# Patient Record
Sex: MALE | Race: BLACK OR AFRICAN AMERICAN | NOT HISPANIC OR LATINO | Employment: OTHER | URBAN - METROPOLITAN AREA
[De-identification: names, ages, dates, MRNs, and addresses within clinical notes are randomized per-mention and may not be internally consistent; named-entity substitution may affect disease eponyms.]

---

## 2023-04-06 ENCOUNTER — APPOINTMENT (OUTPATIENT)
Dept: PRIMARY CARE | Facility: CLINIC | Age: 28
End: 2023-04-06
Payer: COMMERCIAL

## 2023-06-27 ENCOUNTER — OFFICE VISIT (OUTPATIENT)
Dept: PRIMARY CARE | Facility: CLINIC | Age: 28
End: 2023-06-27
Payer: COMMERCIAL

## 2023-06-27 VITALS
HEART RATE: 69 BPM | OXYGEN SATURATION: 98 % | HEIGHT: 70 IN | RESPIRATION RATE: 17 BRPM | DIASTOLIC BLOOD PRESSURE: 77 MMHG | BODY MASS INDEX: 21.9 KG/M2 | SYSTOLIC BLOOD PRESSURE: 136 MMHG | WEIGHT: 153 LBS

## 2023-06-27 DIAGNOSIS — E55.9 VITAMIN D DEFICIENCY: ICD-10-CM

## 2023-06-27 DIAGNOSIS — Z00.00 HEALTH MAINTENANCE EXAMINATION: Primary | ICD-10-CM

## 2023-06-27 DIAGNOSIS — R10.84 GENERALIZED ABDOMINAL PAIN: ICD-10-CM

## 2023-06-27 DIAGNOSIS — K59.09 OTHER CONSTIPATION: ICD-10-CM

## 2023-06-27 DIAGNOSIS — Z11.3 ROUTINE SCREENING FOR STI (SEXUALLY TRANSMITTED INFECTION): ICD-10-CM

## 2023-06-27 DIAGNOSIS — K92.1 BLOODY STOOL: ICD-10-CM

## 2023-06-27 DIAGNOSIS — S89.91XA INJURY OF RIGHT KNEE, INITIAL ENCOUNTER: ICD-10-CM

## 2023-06-27 DIAGNOSIS — F10.10 ALCOHOL ABUSE: ICD-10-CM

## 2023-06-27 LAB
ALANINE AMINOTRANSFERASE (SGPT) (U/L) IN SER/PLAS: 20 U/L (ref 10–52)
ALBUMIN (G/DL) IN SER/PLAS: 5 G/DL (ref 3.4–5)
ALKALINE PHOSPHATASE (U/L) IN SER/PLAS: 55 U/L (ref 33–120)
ANION GAP IN SER/PLAS: 14 MMOL/L (ref 10–20)
APPEARANCE, URINE: CLEAR
ASPARTATE AMINOTRANSFERASE (SGOT) (U/L) IN SER/PLAS: 25 U/L (ref 9–39)
BASOPHILS (10*3/UL) IN BLOOD BY AUTOMATED COUNT: 0.05 X10E9/L (ref 0–0.1)
BASOPHILS/100 LEUKOCYTES IN BLOOD BY AUTOMATED COUNT: 1.1 % (ref 0–2)
BILIRUBIN DIRECT (MG/DL) IN SER/PLAS: 0.3 MG/DL (ref 0–0.3)
BILIRUBIN TOTAL (MG/DL) IN SER/PLAS: 1.2 MG/DL (ref 0–1.2)
BILIRUBIN, URINE: NEGATIVE
BLOOD, URINE: NEGATIVE
CALCIDIOL (25 OH VITAMIN D3) (NG/ML) IN SER/PLAS: 16 NG/ML
CALCIUM (MG/DL) IN SER/PLAS: 10.2 MG/DL (ref 8.6–10.6)
CARBON DIOXIDE, TOTAL (MMOL/L) IN SER/PLAS: 31 MMOL/L (ref 21–32)
CHLORIDE (MMOL/L) IN SER/PLAS: 100 MMOL/L (ref 98–107)
CHOLESTEROL (MG/DL) IN SER/PLAS: 129 MG/DL (ref 0–199)
CHOLESTEROL IN HDL (MG/DL) IN SER/PLAS: 49.8 MG/DL
CHOLESTEROL/HDL RATIO: 2.6
COBALAMIN (VITAMIN B12) (PG/ML) IN SER/PLAS: 582 PG/ML (ref 211–911)
COLOR, URINE: YELLOW
CREATININE (MG/DL) IN SER/PLAS: 0.93 MG/DL (ref 0.5–1.3)
EOSINOPHILS (10*3/UL) IN BLOOD BY AUTOMATED COUNT: 0.09 X10E9/L (ref 0–0.7)
EOSINOPHILS/100 LEUKOCYTES IN BLOOD BY AUTOMATED COUNT: 1.9 % (ref 0–6)
ERYTHROCYTE DISTRIBUTION WIDTH (RATIO) BY AUTOMATED COUNT: 12.7 % (ref 11.5–14.5)
ERYTHROCYTE MEAN CORPUSCULAR HEMOGLOBIN CONCENTRATION (G/DL) BY AUTOMATED: 32.6 G/DL (ref 32–36)
ERYTHROCYTE MEAN CORPUSCULAR VOLUME (FL) BY AUTOMATED COUNT: 98 FL (ref 80–100)
ERYTHROCYTES (10*6/UL) IN BLOOD BY AUTOMATED COUNT: 5.28 X10E12/L (ref 4.5–5.9)
ESTIMATED AVERAGE GLUCOSE FOR HBA1C: 82 MG/DL
FOLATE (NG/ML) IN SER/PLAS: 11 NG/ML
GFR MALE: >90 ML/MIN/1.73M2
GLUCOSE (MG/DL) IN SER/PLAS: 80 MG/DL (ref 74–99)
GLUCOSE, URINE: NEGATIVE MG/DL
HEMATOCRIT (%) IN BLOOD BY AUTOMATED COUNT: 51.5 % (ref 41–52)
HEMOGLOBIN (G/DL) IN BLOOD: 16.8 G/DL (ref 13.5–17.5)
HEMOGLOBIN A1C/HEMOGLOBIN TOTAL IN BLOOD: 4.5 %
HEPATITIS B VIRUS SURFACE AG PRESENCE IN SERUM: NONREACTIVE
HEPATITIS C VIRUS AB PRESENCE IN SERUM: NONREACTIVE
HIV 1/ 2 AG/AB SCREEN: NONREACTIVE
IMMATURE GRANULOCYTES/100 LEUKOCYTES IN BLOOD BY AUTOMATED COUNT: 0.2 % (ref 0–0.9)
KETONES, URINE: NEGATIVE MG/DL
LEUKOCYTE ESTERASE, URINE: NEGATIVE
LEUKOCYTES (10*3/UL) IN BLOOD BY AUTOMATED COUNT: 4.7 X10E9/L (ref 4.4–11.3)
LYMPHOCYTES (10*3/UL) IN BLOOD BY AUTOMATED COUNT: 1 X10E9/L (ref 1.2–4.8)
LYMPHOCYTES/100 LEUKOCYTES IN BLOOD BY AUTOMATED COUNT: 21.5 % (ref 13–44)
MONOCYTES (10*3/UL) IN BLOOD BY AUTOMATED COUNT: 0.4 X10E9/L (ref 0.1–1)
MONOCYTES/100 LEUKOCYTES IN BLOOD BY AUTOMATED COUNT: 8.6 % (ref 2–10)
NEUTROPHILS (10*3/UL) IN BLOOD BY AUTOMATED COUNT: 3.11 X10E9/L (ref 1.2–7.7)
NEUTROPHILS/100 LEUKOCYTES IN BLOOD BY AUTOMATED COUNT: 66.7 % (ref 40–80)
NITRITE, URINE: NEGATIVE
NON-HDL CHOLESTEROL: 79 MG/DL (ref 0–149)
NRBC (PER 100 WBCS) BY AUTOMATED COUNT: 0 /100 WBC (ref 0–0)
PH, URINE: 6 (ref 5–8)
PHOSPHATE (MG/DL) IN SER/PLAS: 3.7 MG/DL (ref 2.5–4.9)
PLATELETS (10*3/UL) IN BLOOD AUTOMATED COUNT: 381 X10E9/L (ref 150–450)
POTASSIUM (MMOL/L) IN SER/PLAS: 4.7 MMOL/L (ref 3.5–5.3)
PROTEIN TOTAL: 7.6 G/DL (ref 6.4–8.2)
PROTEIN, URINE: NEGATIVE MG/DL
SEDIMENTATION RATE, ERYTHROCYTE: <1 MM/H (ref 0–15)
SODIUM (MMOL/L) IN SER/PLAS: 140 MMOL/L (ref 136–145)
SPECIFIC GRAVITY, URINE: 1.02 (ref 1–1.03)
SYPHILIS TOTAL AB: NONREACTIVE
THYROTROPIN (MIU/L) IN SER/PLAS BY DETECTION LIMIT <= 0.05 MIU/L: 0.91 MIU/L (ref 0.44–3.98)
UREA NITROGEN (MG/DL) IN SER/PLAS: 10 MG/DL (ref 6–23)
UROBILINOGEN, URINE: <2 MG/DL (ref 0–1.9)

## 2023-06-27 PROCEDURE — 86780 TREPONEMA PALLIDUM: CPT

## 2023-06-27 PROCEDURE — 86803 HEPATITIS C AB TEST: CPT

## 2023-06-27 PROCEDURE — 85652 RBC SED RATE AUTOMATED: CPT

## 2023-06-27 PROCEDURE — 83036 HEMOGLOBIN GLYCOSYLATED A1C: CPT

## 2023-06-27 PROCEDURE — 1036F TOBACCO NON-USER: CPT | Performed by: STUDENT IN AN ORGANIZED HEALTH CARE EDUCATION/TRAINING PROGRAM

## 2023-06-27 PROCEDURE — 80048 BASIC METABOLIC PNL TOTAL CA: CPT

## 2023-06-27 PROCEDURE — 82465 ASSAY BLD/SERUM CHOLESTEROL: CPT

## 2023-06-27 PROCEDURE — 82746 ASSAY OF FOLIC ACID SERUM: CPT

## 2023-06-27 PROCEDURE — 87340 HEPATITIS B SURFACE AG IA: CPT

## 2023-06-27 PROCEDURE — 87491 CHLMYD TRACH DNA AMP PROBE: CPT

## 2023-06-27 PROCEDURE — 82306 VITAMIN D 25 HYDROXY: CPT

## 2023-06-27 PROCEDURE — 82607 VITAMIN B-12: CPT

## 2023-06-27 PROCEDURE — 83718 ASSAY OF LIPOPROTEIN: CPT

## 2023-06-27 PROCEDURE — 85025 COMPLETE CBC W/AUTO DIFF WBC: CPT

## 2023-06-27 PROCEDURE — 99204 OFFICE O/P NEW MOD 45 MIN: CPT | Performed by: STUDENT IN AN ORGANIZED HEALTH CARE EDUCATION/TRAINING PROGRAM

## 2023-06-27 PROCEDURE — 99385 PREV VISIT NEW AGE 18-39: CPT | Performed by: STUDENT IN AN ORGANIZED HEALTH CARE EDUCATION/TRAINING PROGRAM

## 2023-06-27 PROCEDURE — 84443 ASSAY THYROID STIM HORMONE: CPT

## 2023-06-27 PROCEDURE — 84100 ASSAY OF PHOSPHORUS: CPT

## 2023-06-27 PROCEDURE — 90715 TDAP VACCINE 7 YRS/> IM: CPT | Performed by: STUDENT IN AN ORGANIZED HEALTH CARE EDUCATION/TRAINING PROGRAM

## 2023-06-27 PROCEDURE — 80076 HEPATIC FUNCTION PANEL: CPT

## 2023-06-27 PROCEDURE — 87389 HIV-1 AG W/HIV-1&-2 AB AG IA: CPT

## 2023-06-27 PROCEDURE — 87591 N.GONORRHOEAE DNA AMP PROB: CPT

## 2023-06-27 PROCEDURE — 90471 IMMUNIZATION ADMIN: CPT | Performed by: STUDENT IN AN ORGANIZED HEALTH CARE EDUCATION/TRAINING PROGRAM

## 2023-06-27 PROCEDURE — 81003 URINALYSIS AUTO W/O SCOPE: CPT

## 2023-06-27 RX ORDER — ARM BRACE
1 EACH MISCELLANEOUS DAILY
Qty: 1 EACH | Refills: 0 | Status: SHIPPED | OUTPATIENT
Start: 2023-06-27

## 2023-06-27 RX ORDER — LANOLIN ALCOHOL/MO/W.PET/CERES
100 CREAM (GRAM) TOPICAL DAILY
Qty: 30 TABLET | Refills: 3 | Status: SHIPPED | OUTPATIENT
Start: 2023-06-27 | End: 2023-10-25

## 2023-06-27 RX ORDER — OMEPRAZOLE 20 MG/1
20 CAPSULE, DELAYED RELEASE ORAL DAILY
Qty: 90 CAPSULE | Refills: 0 | Status: SHIPPED | OUTPATIENT
Start: 2023-06-27 | End: 2023-09-25

## 2023-06-27 RX ORDER — MULTIVITAMIN
1 TABLET ORAL DAILY
Qty: 30 TABLET | Refills: 11 | Status: SHIPPED | OUTPATIENT
Start: 2023-06-27 | End: 2024-06-26

## 2023-06-27 RX ORDER — POLYETHYLENE GLYCOL 3350 17 G/17G
17 POWDER, FOR SOLUTION ORAL DAILY
Qty: 30 EACH | Refills: 3 | Status: SHIPPED | OUTPATIENT
Start: 2023-06-27 | End: 2023-06-29

## 2023-06-27 NOTE — PATIENT INSTRUCTIONS
Labs today in [address].     Tetanus shot today in clinic.     I will be in touch with lab results.     Anti-acid stomach medication and multivitamin sent to [facility].     Good job coming in today and cutting back on drinking--keep up the good work.    Follow up in 3 months!    Best,  Dr. SALAZAR

## 2023-06-27 NOTE — PROGRESS NOTES
Alpesh Spencer is a 27 y.o. male seen in Clinic at /[facility] by Dr. Gerald Aquino on 06/27/23 for routine care, as well as for management of the following chronic medical conditions: none. He presents today to establish care, for CPE, and with acute concern of ongoing alcohol abuse with abdominal pain and possible blood in stool, along with R knee pain after fall from ladder. Knee without any laxity on exam today, small effusion but improving per patient. Continue supportive care, ice/pain relief/compression. No indication for imaging at this time. Regarding abdominal pain, overall diffuse but somewhat lower/overlying bladder when asked. Has had significant alcohol abuse in last 5-6 months since death of father (up to pint per day of liquor) putting him at risk for gastritis/esophagitis. No prior history of prolonged GI symptoms per patient on initial questioning, however 2017 ED visit with note of bloody diarrhea. Also complains of some unintentional weight loss. Will perform labs, lytes, inflammatory markers, vitamin level to assess for deficiency given history. May need GI referral. Discussed alcohol cessation at length, defers IOP or psych referrals. Close follow up in 2-3 months for check in, sooner pending labs. Empiric PPI and MVI with folate, thiamine.     #Alcohol Use  - up to pint per day  - since father's death   - defers IOP or psych  - agreeable to MVI with folate, Thiamine   #Abdominal Pain  - has lost around 10 pounds, unintentional   - no food insecurity   - ?blood in stool   - empiric PPI  - lab screening   - may need GI referral pending persistence of symptoms, continued alcohol use, response to PPI, lab evaluation   #Knee Pain  - slight effusion on exam, improving, gait not impacted   - no indication for imaging   - supportive care      Past Medical History: as above   No past medical history on file.    Past Surgical History: none  No past surgical history on file.    Medications: none   No  "current outpatient medications on file.  Pharmacy: [facility]    Allergies: NKDA  No Known Allergies    Immunizations: Tdap today (last 2010)     Family History: father with death from MI around age 65   No family history on file.    Social History:   Home/Living Situation: lives at home with mother; feels safe at home   Education: [employer]   Employment/Work/Vocational: works as [employer]   Activities:   Drug Use: MJ use in the past but not currently; alcohol use up to 1 pint per day, has recently \"slowed down\"  Diet: no food insecurity   Sexuality/Contraception/Menstrual History: agreeable to STI screening today   Suicide/Depression/Anxiety: father's death in December has been difficult   Sleep: no concerns     Transition Processes:  Financial/Health Insurance: has insurance   Transportation: drives   Legal/Guardian: Claudette Richards, [phone]  Contact Information: [phone]    Visit Vitals  /77 (BP Location: Right arm, Patient Position: Sitting)   Pulse 69   Resp 17   Ht 1.778 m (5' 10\")   Wt 69.4 kg (153 lb)   SpO2 98%   BMI 21.95 kg/m²   Smoking Status Never   BSA 1.85 m²      PHYSICAL EXAM:   General: well appearing AA male, NAD, pleasant and engaged in encounter    HEENT: NCAT, MMM, no oral lesions noted   CV: RRR, no m/r/g  PULM: CTAB, non-labored respirations   ABD: soft, thin, mild TTP diffusely, ND, + bowel sounds; no rebound/guarding   : mild suprapubic tenderness; no CVA tenderness   EXT: WWP, no significant edema   SKIN: no rashes noted   NEURO: A&Ox4, symmetric facies, no gross motor or sensory deficits, normal gait  PSYCH: pleasant mood, appropriate affect     Assessment/Plan    Alpesh Spencer is a 27 y.o. male seen in Clinic at /[facility] by Dr. Gerald Aquino on 06/27/23 for routine care, as well as for management of the following chronic medical conditions: none. He presents today to establish care, for CPE, and with acute concern of ongoing alcohol " abuse with abdominal pain and possible blood in stool, along with R knee pain after fall from ladder. Knee without any laxity on exam today, small effusion but improving per patient. Continue supportive care, ice/pain relief/compression. No indication for imaging at this time. Regarding abdominal pain, overall diffuse but somewhat lower/overlying bladder when asked. Has had significant alcohol abuse in last 5-6 months since death of father (up to pint per day of liquor) putting him at risk for gastritis/esophagitis. No prior history of prolonged GI symptoms per patient on initial questioning, however 2017 ED visit with note of bloody diarrhea. Also complains of some unintentional weight loss. Will perform labs, lytes, inflammatory markers, vitamin level to assess for deficiency given history. May need GI referral. Discussed alcohol cessation at length, defers IOP or psych referrals. Close follow up in 2-3 months for check in, sooner pending labs. Empiric PPI and MVI with folate, thiamine.     #Alcohol Use  - up to pint per day  - since father's death   - defers IOP or psych  - agreeable to MVI with folate, Thiamine   #Abdominal Pain  - has lost around 10 pounds, unintentional   - no food insecurity   - ?blood in stool   - empiric PPI  - lab screening   - may need GI referral pending persistence of symptoms, continued alcohol use, response to PPI, lab evaluation   #Knee Pain  - slight effusion on exam, improving, gait not impacted   - no indication for imaging   - supportive care      #Health Maintenance   - Vision, Hearing screens: no concerns   - Counseling regarding alcohol/tobacco/drug use: as above   - Depression screen: recent death of father, defers counseling referral   - BMI, Lipid, A1C screening and nutritional/exercise counseling: labs today   - Blood Pressure: elevated (recent significant alcohol use)   - Safe Sexual Practices, STI, HIV screening: ordered today     #Transition of Care/Young Adult Care  -  Health Literacy Assessment: working to improve, engaged today   - Medications: Active medications reviewed and updated  - Allergies: Reviewed and updated   - Immunizations: Tdap today   - Identified Adult or Subspecialty Providers: Dr. Gerald Aquino  - Resources Provided: labs     Return to clinic in 2-3 months for follow-up, sooner if acute issues arise.     Gerald Aquino MD  Internal Medicine-Pediatrics   [facility] [address], [address]  P: [phone], F: [phone]

## 2023-06-28 LAB
CHLAMYDIA TRACH., AMPLIFIED: NEGATIVE
N. GONORRHEA, AMPLIFIED: NEGATIVE

## 2023-06-28 RX ORDER — CHOLECALCIFEROL (VITAMIN D3) 50 MCG
50 TABLET ORAL DAILY
Qty: 90 TABLET | Refills: 3 | Status: SHIPPED | OUTPATIENT
Start: 2023-06-28 | End: 2024-06-22

## 2023-06-29 DIAGNOSIS — K59.09 OTHER CONSTIPATION: ICD-10-CM

## 2023-06-29 RX ORDER — FLUTICASONE PROPIONATE 50 MCG
SPRAY, SUSPENSION (ML) NASAL
Qty: 28 PACKET | Refills: 3 | Status: SHIPPED | OUTPATIENT
Start: 2023-06-29

## 2024-04-19 ENCOUNTER — HOSPITAL ENCOUNTER (EMERGENCY)
Facility: HOSPITAL | Age: 29
Discharge: HOME | End: 2024-04-19
Attending: EMERGENCY MEDICINE

## 2024-04-19 VITALS
OXYGEN SATURATION: 98 % | RESPIRATION RATE: 16 BRPM | HEART RATE: 84 BPM | DIASTOLIC BLOOD PRESSURE: 76 MMHG | HEIGHT: 71 IN | TEMPERATURE: 97.5 F | BODY MASS INDEX: 21.7 KG/M2 | SYSTOLIC BLOOD PRESSURE: 150 MMHG | WEIGHT: 155 LBS

## 2024-04-19 DIAGNOSIS — S60.519A: Primary | ICD-10-CM

## 2024-04-19 PROCEDURE — 99284 EMERGENCY DEPT VISIT MOD MDM: CPT | Performed by: EMERGENCY MEDICINE

## 2024-04-19 PROCEDURE — 99283 EMERGENCY DEPT VISIT LOW MDM: CPT

## 2024-04-19 ASSESSMENT — COLUMBIA-SUICIDE SEVERITY RATING SCALE - C-SSRS
6. HAVE YOU EVER DONE ANYTHING, STARTED TO DO ANYTHING, OR PREPARED TO DO ANYTHING TO END YOUR LIFE?: NO
2. HAVE YOU ACTUALLY HAD ANY THOUGHTS OF KILLING YOURSELF?: NO
1. IN THE PAST MONTH, HAVE YOU WISHED YOU WERE DEAD OR WISHED YOU COULD GO TO SLEEP AND NOT WAKE UP?: NO

## 2024-04-19 ASSESSMENT — PAIN SCALES - GENERAL: PAINLEVEL_OUTOF10: 0 - NO PAIN

## 2024-04-19 ASSESSMENT — PAIN - FUNCTIONAL ASSESSMENT: PAIN_FUNCTIONAL_ASSESSMENT: 0-10
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
## 2024-04-19 NOTE — DISCHARGE INSTRUCTIONS
Please keep your hand abrasions clean, dry.  Please return to the emergency department if you have fevers, drainage from the hands that look like pus, significant swelling of your hands and difficulty moving your fingers.